# Patient Record
Sex: MALE
[De-identification: names, ages, dates, MRNs, and addresses within clinical notes are randomized per-mention and may not be internally consistent; named-entity substitution may affect disease eponyms.]

---

## 2017-05-28 ENCOUNTER — CHARTING TRANS (OUTPATIENT)
Dept: OTHER | Age: 62
End: 2017-05-28

## 2017-06-09 ENCOUNTER — CHARTING TRANS (OUTPATIENT)
Dept: OTHER | Age: 62
End: 2017-06-09

## 2018-11-03 VITALS
HEIGHT: 67 IN | DIASTOLIC BLOOD PRESSURE: 66 MMHG | HEART RATE: 78 BPM | WEIGHT: 150 LBS | BODY MASS INDEX: 23.54 KG/M2 | SYSTOLIC BLOOD PRESSURE: 110 MMHG | RESPIRATION RATE: 20 BRPM | TEMPERATURE: 98.7 F

## 2018-11-03 VITALS
HEIGHT: 67 IN | WEIGHT: 150 LBS | RESPIRATION RATE: 20 BRPM | BODY MASS INDEX: 23.54 KG/M2 | TEMPERATURE: 99.4 F | HEART RATE: 74 BPM

## 2021-05-12 PROBLEM — Z87.11 HISTORY OF BLEEDING PEPTIC ULCER: Status: ACTIVE | Noted: 2021-05-12

## 2023-12-26 ENCOUNTER — APPOINTMENT (OUTPATIENT)
Dept: GENERAL RADIOLOGY | Age: 68
End: 2023-12-26
Attending: EMERGENCY MEDICINE
Payer: MEDICARE

## 2023-12-26 ENCOUNTER — HOSPITAL ENCOUNTER (INPATIENT)
Facility: HOSPITAL | Age: 68
LOS: 2 days | Discharge: HOME | End: 2023-12-28
Attending: EMERGENCY MEDICINE | Admitting: HOSPITALIST
Payer: MEDICARE

## 2023-12-26 ENCOUNTER — APPOINTMENT (OUTPATIENT)
Dept: CT IMAGING | Age: 68
End: 2023-12-26
Attending: EMERGENCY MEDICINE
Payer: MEDICARE

## 2023-12-26 DIAGNOSIS — K56.609 SMALL BOWEL OBSTRUCTION (HCC): Primary | ICD-10-CM

## 2023-12-26 LAB
ALBUMIN SERPL-MCNC: 4 G/DL (ref 3.4–5)
ALBUMIN/GLOB SERPL: 1.5 {RATIO} (ref 1–2)
ALP LIVER SERPL-CCNC: 83 U/L
ALT SERPL-CCNC: 28 U/L
ANION GAP SERPL CALC-SCNC: 5 MMOL/L (ref 0–18)
AST SERPL-CCNC: 24 U/L (ref 15–37)
BASOPHILS # BLD AUTO: 0.06 X10(3) UL (ref 0–0.2)
BASOPHILS NFR BLD AUTO: 0.9 %
BILIRUB SERPL-MCNC: 0.4 MG/DL (ref 0.1–2)
BUN BLD-MCNC: 18 MG/DL (ref 9–23)
CALCIUM BLD-MCNC: 9 MG/DL (ref 8.5–10.1)
CHLORIDE SERPL-SCNC: 107 MMOL/L (ref 98–112)
CO2 SERPL-SCNC: 25 MMOL/L (ref 21–32)
CREAT BLD-MCNC: 1.01 MG/DL
EGFRCR SERPLBLD CKD-EPI 2021: 80 ML/MIN/1.73M2 (ref 60–?)
EOSINOPHIL # BLD AUTO: 0.27 X10(3) UL (ref 0–0.7)
EOSINOPHIL NFR BLD AUTO: 3.8 %
ERYTHROCYTE [DISTWIDTH] IN BLOOD BY AUTOMATED COUNT: 11.7 %
GLOBULIN PLAS-MCNC: 2.6 G/DL (ref 2.8–4.4)
GLUCOSE BLD-MCNC: 130 MG/DL (ref 70–99)
HCT VFR BLD AUTO: 37.1 %
HGB BLD-MCNC: 12.9 G/DL
IMM GRANULOCYTES # BLD AUTO: 0.02 X10(3) UL (ref 0–1)
IMM GRANULOCYTES NFR BLD: 0.3 %
LIPASE SERPL-CCNC: 48 U/L (ref 13–75)
LYMPHOCYTES # BLD AUTO: 1.99 X10(3) UL (ref 1–4)
LYMPHOCYTES NFR BLD AUTO: 28.2 %
MCH RBC QN AUTO: 33.3 PG (ref 26–34)
MCHC RBC AUTO-ENTMCNC: 34.8 G/DL (ref 31–37)
MCV RBC AUTO: 95.9 FL
MONOCYTES # BLD AUTO: 0.7 X10(3) UL (ref 0.1–1)
MONOCYTES NFR BLD AUTO: 9.9 %
NEUTROPHILS # BLD AUTO: 4.01 X10 (3) UL (ref 1.5–7.7)
NEUTROPHILS # BLD AUTO: 4.01 X10(3) UL (ref 1.5–7.7)
NEUTROPHILS NFR BLD AUTO: 56.9 %
OSMOLALITY SERPL CALC.SUM OF ELEC: 288 MOSM/KG (ref 275–295)
PLATELET # BLD AUTO: 220 10(3)UL (ref 150–450)
POTASSIUM SERPL-SCNC: 3.7 MMOL/L (ref 3.5–5.1)
PROT SERPL-MCNC: 6.6 G/DL (ref 6.4–8.2)
RBC # BLD AUTO: 3.87 X10(6)UL
SODIUM SERPL-SCNC: 137 MMOL/L (ref 136–145)
TROPONIN I SERPL HS-MCNC: 5 NG/L
WBC # BLD AUTO: 7.1 X10(3) UL (ref 4–11)

## 2023-12-26 PROCEDURE — 85025 COMPLETE CBC W/AUTO DIFF WBC: CPT | Performed by: EMERGENCY MEDICINE

## 2023-12-26 PROCEDURE — 80053 COMPREHEN METABOLIC PANEL: CPT

## 2023-12-26 PROCEDURE — 96374 THER/PROPH/DIAG INJ IV PUSH: CPT

## 2023-12-26 PROCEDURE — 83690 ASSAY OF LIPASE: CPT | Performed by: EMERGENCY MEDICINE

## 2023-12-26 PROCEDURE — 83690 ASSAY OF LIPASE: CPT

## 2023-12-26 PROCEDURE — 71045 X-RAY EXAM CHEST 1 VIEW: CPT | Performed by: EMERGENCY MEDICINE

## 2023-12-26 PROCEDURE — 0D9670Z DRAINAGE OF STOMACH WITH DRAINAGE DEVICE, VIA NATURAL OR ARTIFICIAL OPENING: ICD-10-PCS | Performed by: EMERGENCY MEDICINE

## 2023-12-26 PROCEDURE — 96375 TX/PRO/DX INJ NEW DRUG ADDON: CPT

## 2023-12-26 PROCEDURE — 85025 COMPLETE CBC W/AUTO DIFF WBC: CPT

## 2023-12-26 PROCEDURE — 96376 TX/PRO/DX INJ SAME DRUG ADON: CPT

## 2023-12-26 PROCEDURE — 84484 ASSAY OF TROPONIN QUANT: CPT

## 2023-12-26 PROCEDURE — 99285 EMERGENCY DEPT VISIT HI MDM: CPT

## 2023-12-26 PROCEDURE — 74177 CT ABD & PELVIS W/CONTRAST: CPT | Performed by: EMERGENCY MEDICINE

## 2023-12-26 PROCEDURE — 93005 ELECTROCARDIOGRAM TRACING: CPT

## 2023-12-26 PROCEDURE — 80053 COMPREHEN METABOLIC PANEL: CPT | Performed by: EMERGENCY MEDICINE

## 2023-12-26 PROCEDURE — 93010 ELECTROCARDIOGRAM REPORT: CPT

## 2023-12-26 PROCEDURE — 84484 ASSAY OF TROPONIN QUANT: CPT | Performed by: EMERGENCY MEDICINE

## 2023-12-26 RX ORDER — ONDANSETRON 2 MG/ML
4 INJECTION INTRAMUSCULAR; INTRAVENOUS EVERY 4 HOURS PRN
Status: DISCONTINUED | OUTPATIENT
Start: 2023-12-26 | End: 2023-12-26

## 2023-12-26 RX ORDER — ONDANSETRON 2 MG/ML
4 INJECTION INTRAMUSCULAR; INTRAVENOUS EVERY 6 HOURS PRN
Status: DISCONTINUED | OUTPATIENT
Start: 2023-12-26 | End: 2023-12-28

## 2023-12-26 RX ORDER — MORPHINE SULFATE 2 MG/ML
2 INJECTION, SOLUTION INTRAMUSCULAR; INTRAVENOUS EVERY 2 HOUR PRN
Status: DISCONTINUED | OUTPATIENT
Start: 2023-12-26 | End: 2023-12-28

## 2023-12-26 RX ORDER — MORPHINE SULFATE 4 MG/ML
4 INJECTION, SOLUTION INTRAMUSCULAR; INTRAVENOUS ONCE
Status: COMPLETED | OUTPATIENT
Start: 2023-12-26 | End: 2023-12-26

## 2023-12-26 RX ORDER — ONDANSETRON 2 MG/ML
4 INJECTION INTRAMUSCULAR; INTRAVENOUS ONCE
Status: COMPLETED | OUTPATIENT
Start: 2023-12-26 | End: 2023-12-26

## 2023-12-26 RX ORDER — MORPHINE SULFATE 2 MG/ML
1 INJECTION, SOLUTION INTRAMUSCULAR; INTRAVENOUS EVERY 2 HOUR PRN
Status: DISCONTINUED | OUTPATIENT
Start: 2023-12-26 | End: 2023-12-28

## 2023-12-26 RX ORDER — HEPARIN SODIUM 5000 [USP'U]/ML
5000 INJECTION, SOLUTION INTRAVENOUS; SUBCUTANEOUS EVERY 8 HOURS SCHEDULED
Status: DISCONTINUED | OUTPATIENT
Start: 2023-12-26 | End: 2023-12-28

## 2023-12-26 RX ORDER — SODIUM CHLORIDE 9 MG/ML
INJECTION, SOLUTION INTRAVENOUS CONTINUOUS
Status: DISCONTINUED | OUTPATIENT
Start: 2023-12-26 | End: 2023-12-28

## 2023-12-26 RX ORDER — SODIUM CHLORIDE 9 MG/ML
INJECTION, SOLUTION INTRAVENOUS CONTINUOUS
Status: ACTIVE | OUTPATIENT
Start: 2023-12-26 | End: 2023-12-26

## 2023-12-26 RX ORDER — MORPHINE SULFATE 4 MG/ML
4 INJECTION, SOLUTION INTRAMUSCULAR; INTRAVENOUS EVERY 2 HOUR PRN
Status: DISCONTINUED | OUTPATIENT
Start: 2023-12-26 | End: 2023-12-28

## 2023-12-26 RX ORDER — MORPHINE SULFATE 4 MG/ML
4 INJECTION, SOLUTION INTRAMUSCULAR; INTRAVENOUS EVERY 30 MIN PRN
Status: DISCONTINUED | OUTPATIENT
Start: 2023-12-26 | End: 2023-12-26

## 2023-12-26 NOTE — PROGRESS NOTES
NURSING ADMISSION NOTE      Patient admitted via Ambulance  Oriented to room. Safety precautions initiated. Bed in low position. Call light in reach. Pt admitted from [facility] for SBO. A&Ox4. VSS on RA. Rates pain 2/10 to abd. NG tube to LIS. 0.9NS @ 100ml/hr. Up at collette. GS following. NPO, occasional ice chips. Safety precautions in place. All needs met at this time.

## 2023-12-26 NOTE — ED QUICK NOTES
Attempted to give report to Portillo Murillo RN prior to [employer] departure, no answer on RN phone.

## 2023-12-26 NOTE — ED INITIAL ASSESSMENT (HPI)
C/O upper abd pain. States started around 11am and is getting worse. Nausea w/o vomiting. Denies changes in urine or stool.

## 2023-12-27 ENCOUNTER — APPOINTMENT (OUTPATIENT)
Dept: GENERAL RADIOLOGY | Facility: HOSPITAL | Age: 68
End: 2023-12-27
Attending: PHYSICIAN ASSISTANT
Payer: MEDICARE

## 2023-12-27 LAB
ANION GAP SERPL CALC-SCNC: 6 MMOL/L (ref 0–18)
ATRIAL RATE: 65 BPM
BUN BLD-MCNC: 10 MG/DL (ref 9–23)
CALCIUM BLD-MCNC: 8.5 MG/DL (ref 8.5–10.1)
CHLORIDE SERPL-SCNC: 109 MMOL/L (ref 98–112)
CO2 SERPL-SCNC: 26 MMOL/L (ref 21–32)
CREAT BLD-MCNC: 0.81 MG/DL
EGFRCR SERPLBLD CKD-EPI 2021: 95 ML/MIN/1.73M2 (ref 60–?)
GLUCOSE BLD-MCNC: 90 MG/DL (ref 70–99)
OSMOLALITY SERPL CALC.SUM OF ELEC: 291 MOSM/KG (ref 275–295)
P AXIS: 80 DEGREES
P-R INTERVAL: 210 MS
POTASSIUM SERPL-SCNC: 3.8 MMOL/L (ref 3.5–5.1)
Q-T INTERVAL: 436 MS
QRS DURATION: 82 MS
QTC CALCULATION (BEZET): 453 MS
R AXIS: -5 DEGREES
SODIUM SERPL-SCNC: 141 MMOL/L (ref 136–145)
T AXIS: 76 DEGREES
VENTRICULAR RATE: 65 BPM

## 2023-12-27 PROCEDURE — 74019 RADEX ABDOMEN 2 VIEWS: CPT | Performed by: PHYSICIAN ASSISTANT

## 2023-12-27 PROCEDURE — 80048 BASIC METABOLIC PNL TOTAL CA: CPT | Performed by: HOSPITALIST

## 2023-12-27 NOTE — PROGRESS NOTES
Received pt A&Ox4. Maintaining O2 sats on room air. VSS. No tele ordered. Heparin ordered, however pt declining. Ambulating in hallways. NPO. NGT to LIS. Continuous IVF infusing. Denies any pain. XR abdomen obstructive series ordered. Plan of care continues, endorsed report to oncoming JAI Marie at [phone].

## 2023-12-27 NOTE — PLAN OF CARE
Assumed care at 2300. Pt is A&Ox4. Wears glasses. VSS, afebrile. Maintaining O2 sats WDL on RA. Encouraged IS. No tele, Q8 vitals. EP. Last BM 12/25. NPO, okay for ice chips. Voids. Up ad collette. Denies pain. PIV, IVF. No further needs at this time, continue POC. Safety precautions in place.     Problem: Patient/Family Goals  Goal: Patient/Family Long Term Goal  Description: Patient's Long Term Goal: DC home    Interventions:  - GS following  - pain management  -resolve of SBO  - See additional Care Plan goals for specific interventions  Outcome: Progressing  Goal: Patient/Family Short Term Goal  Description: Patient's Short Term Goal: pain management  12/26 NOC: sleep    Interventions:   - Per MAR  - NPO and advance per GS  - NG tube  - See additional Care Plan goals for specific interventions  Outcome: Progressing

## 2023-12-27 NOTE — PLAN OF CARE
Patient AAOx4. Glasses. Room air. No tele. NG tube began at LIS, clamped, now removed. Started on clears. Up ad collette. IVF. Patient rounded on routinely. Patient updated on plan of care.       Problem: Patient/Family Goals  Goal: Patient/Family Long Term Goal  Description: Patient's Long Term Goal: DC home    Interventions:  - GS following  - pain management  -resolve of SBO  - See additional Care Plan goals for specific interventions  Outcome: Progressing  Goal: Patient/Family Short Term Goal  Description: Patient's Short Term Goal: pain management  12/26 NOC: sleep    Interventions:   - Per MAR  - NPO and advance per GS  - NG tube  - See additional Care Plan goals for specific interventions  Outcome: Progressing

## 2023-12-28 VITALS
HEIGHT: 67 IN | SYSTOLIC BLOOD PRESSURE: 108 MMHG | TEMPERATURE: 98 F | WEIGHT: 138 LBS | HEART RATE: 58 BPM | OXYGEN SATURATION: 99 % | BODY MASS INDEX: 21.66 KG/M2 | RESPIRATION RATE: 16 BRPM | DIASTOLIC BLOOD PRESSURE: 51 MMHG

## 2023-12-28 NOTE — PLAN OF CARE
Pt is a&ox4. Glasses. RA sating WNL. No tele. EP      (non-cardiac). Heparin. IVF's. Voids per BRP, up ad collette. Tolerating a clear diet- per gen surg okay to advance as tolerated. Pt updated on poc. No further needs at this time.     Problem: Patient/Family Goals  Goal: Patient/Family Long Term Goal  Description: Patient's Long Term Goal: DC home    Interventions:  - GS following  - pain management  -resolve of SBO  - See additional Care Plan goals for specific interventions  Outcome: Progressing  Goal: Patient/Family Short Term Goal  Description: Patient's Short Term Goal: pain management  12/26 NOC: sleep  12/27 NOC: sleep    Interventions:   - Per MAR  - NPO and advance per GS  - NG tube  - See additional Care Plan goals for specific interventions  Outcome: Progressing

## 2023-12-28 NOTE — PLAN OF CARE
Pt is A&Ox4. Wears glasses. VSS, afebrile. Maintaining O2 sats WDL on RA. Encouraged IS. No tele, Q8 vitals. EP. Last BM 12/25. CLD. Voids. Up ad collette. Denies pain. PIV, IVF. No further needs at this time, continue POC.  Safety precautions in place     Problem: Patient/Family Goals  Goal: Patient/Family Long Term Goal  Description: Patient's Long Term Goal: DC home    Interventions:  - GS following  - pain management  -resolve of SBO  - See additional Care Plan goals for specific interventions  12/28/2023 0103 by Loyda Brennan, RN  Outcome: Progressing  12/28/2023 0102 by Loyda Brennan, RN  Outcome: Progressing  Goal: Patient/Family Short Term Goal  Description: Patient's Short Term Goal: pain management  12/26 [address] [place]: sleep  12/27 [address] [place]: sleep    Interventions:   - Per MAR  - NPO and advance per GS  - NG tube  - See additional Care Plan goals for specific interventions  12/28/2023 0103 by Loyda Brennan, RN  Outcome: Progressing  12/28/2023 0102 by Loyda Brennan, RN  Outcome: Progressing

## 2023-12-28 NOTE — PROGRESS NOTES
NURSING DISCHARGE NOTE     Discharged Home via Wheelchair. Accompanied by Support staff  Belongings Taken by patient/family. Patient is stable to discharge home. Medically cleared by all consults and hospitalist. Reviewed discharge instructions about medications and follow-up appointments with the patient. Patient verbalized understanding. Questions and concerns addressed. IV line dc'ed. Pressure and dressing applied. Clean/Dry/Intact. Discharge navigator completed. Tele box removed,cleaned and returned to drawer. All belongings sent with patient.

## 2023-12-29 NOTE — DISCHARGE SUMMARY
General Medicine Discharge Summary     Patient ID:  Gloria Quintana III  77year old  [id number]  7/15/1955    Admit date: 12/26/2023    Discharge date and time: 12/28/2023  5:48 PM     Attending Luis Bowser MD    Primary Care Physician: Kirstin Bailey MD     Reason for admission: sbo      Risk of readmission: Gloria Quintana III has Moderate Risk of readmission after discharge from the hospital.    Hospital Discharge Diagnoses:  as above    Lace+ Score: 37  59-90 High Risk  29-58 Medium Risk  0-28   Low Risk. TCM Follow-Up Recommendation:  LACE 29-58: Moderate Risk of readmission after discharge from the hospital.          Discharge Condition: alive    Important follow up  -PCP Kirstin Bailey MD see appointments listed below    -Labs: prn  -Radiology:  prn            Hospital Course:    77year old male with BPH who is admitted for abdominal pain, n/v.      **pSBO on CT a/p-resolved  -pt with hx appendectomy in past. Hx ulcer as well  -never had sbo in past  -surgery on consult, appreciate  -IV morphine prn, zofran prn  -sp NG in place- management per surgery. Currently clamped  -diet advancement per surgery     **incidental finding of urinary bladder diverticulum and enlarged prostate on CT a/p - f/u with PCP     Bph, no acute issue, f/u outpatient  Consults: IP CONSULT TO GENERAL SURGERY    Radiology:  XR ABDOMEN OBSTRUCTIVE SERIES ROUTINE(2 VW)(CPT=74019)    Result Date: 12/27/2023  PROCEDURE:  XR ABDOMEN OBSTRUCTIVE SERIES ROUTINE(2 VW)(CPT=74019)  TECHNIQUE:  2 view obstructive series of the abdomen and pelvis were obtained. COMPARISON:  [facility], CT, CT ABDOMEN PELVIS IV CONTRAST, NO ORAL (ER), 12/26/2023, 4:39 AM.  INDICATIONS:  Patient presents with severe abdominal pain, clinical concern for potential small bowel obstruction. PATIENT STATED HISTORY: (As transcribed by Technologist)  Patient states he came to the E.R. on Monday night with severe abdominal pain.     FINDINGS: There is an NG tube noted with the tip in the proximal stomach/gastric cardia. There is no current evidence of significant small bowel dilatation or fluid distension. There is a moderate to extensive degree of retained feces throughout the colon. No evidence of free air. CONCLUSION:  1. NG tube placement with relative decompression of the small bowel in comparison to the recent CT scan of 12/26/2023. 2. Moderate to extensive retained feces throughout the colon. 3. No evidence of free air or acute process. LOCATION:  [id number]    Dictated by (CST): Gia Bah DO on 12/27/2023 at 11:53 AM     Finalized by (CST): Gia Bah DO on 12/27/2023 at 11:55 AM       XR CHEST AP PORTABLE  (CPT=71045)    Result Date: 12/26/2023  PROCEDURE:  XR CHEST AP PORTABLE  (CPT=71045)  TECHNIQUE:  AP chest radiograph was obtained. COMPARISON:  None. INDICATIONS:  Verify correct tube placement  PATIENT STATED HISTORY: (As transcribed by Technologist)  Verify correct tube placement;    FINDINGS:  NG tube tip and side-port in the stomach. Minimal atelectasis/scarring in the lower lungs. Normal heart size and pulmonary vascularity. No pleural effusion or pneumothorax. No lobar consolidation. CONCLUSION:  NG tube tip and side-port in the stomach. LOCATION:  [facility]      Dictated by (CST): Miguel Ángel Mcarthur MD on 12/26/2023 at 7:21 AM     Finalized by (CST): Miguel Ángel Mcarthur MD on 12/26/2023 at 7:21 AM       CT ABDOMEN PELVIS IV CONTRAST, NO ORAL (ER)    Result Date: 12/26/2023  PROCEDURE:  CT ABDOMEN PELVIS IV CONTRAST, NO ORAL (ER)  COMPARISON:  None. INDICATIONS:  Epigastic pain, nausea, denies fevers. Worsening since 11pm, vomiting, eval for SBO  TECHNIQUE:  CT scanning was performed from the dome of the diaphragm to the pubic symphysis with non-ionic intravenous contrast material. Post contrast coronal MPR imaging was performed. Dose reduction techniques were used.  Dose information is transmitted to the ACR ([facility] Radiology) NRDR ([address]) which includes the Dose Index Registry. PATIENT STATED HISTORY:(As transcribed by Technologist)  Epigastic pain, nausea, denies fevers. Worsening since 11pm, vomiting, eval for SBO    CONTRAST USED:  80cc of Isovue 370  FINDINGS: LUNG BASE:  Scattered atelectasis/scarring. Small cluster of noncalcified micronodularity in the periphery of the right lower lobe on image 1 series 301 measuring up to 3 mm. LIVER:  Subcentimeter hypodensities in the liver too small to further characterize and warrant no specific follow-up measuring up to 9 mm. Additional simple appearing hepatic cysts noted measuring up to 13 mm. BILIARY:  Unremarkable. SPLEEN:  Unremarkable. PANCREAS:  Unremarkable. ADRENALS:  Unremarkable. KIDNEYS:  Simple cyst along the lower pole right kidney measuring up to 1.7 cm. AORTA/VASCULAR:  Minimal mixed plaque in the aorta RETROPERITONEUM:  Unremarkable. BOWEL/MESENTERY:  Moderate feces in the colon. There are mildly dilated loops of small bowel with scattered air-fluid levels. A reference diameter of 1 of the small bowel loops is approximately 3.2 cm. There is a probable transition point in the mid abdomen best seen on image 48 of series 2. The overall findings are concerning for a partial small bowel obstruction, with nonspecific enteritis or other etiologies not entirely excluded. Clinical correlation and follow-up is suggested. There is no free air or free fluid. ABDOMINAL WALL:  Unremarkable. PELVIC ORGANS:  Urinary bladder wall thickening could be related to chronic outlet obstructionversus cystitis. Clinical correlation recommended. Large diverticulum along the right side the urinary bladder measuring up to 5.4 cm. There is moderate prostate enlargement deforming the base the urinary bladder. Pelvic phleboliths. LYMPH NODES:  No lymphadenopathy in the abdomen or pelvis.  BONES:  Degenerative changes in the spine and hips. OTHER:  None. CONCLUSION:   1. There are mildly dilated loops of small bowel with scattered air-fluid levels. A reference diameter of 1 of the small bowel loops is approximately 3.2 cm. There is a probable transition point in the mid abdomen best seen on image 48 of series 2. The  overall findings are concerning for a partial small bowel obstruction, with nonspecific enteritis or other etiologies not entirely excluded. Clinical correlation and follow-up is suggested. 2. Urinary bladder wall thickening could be related to chronic outlet obstructionversus cystitis. Clinical correlation recommended. Large diverticulum along the right side the urinary bladder measuring up to 5.4 cm. 3. There is moderate prostate enlargement deforming the base the urinary bladder. Please see above for further details. A preliminary report was provided by the [facility] teleradiology service. LOCATION:  [facility]   Dictated by (CST): Erin Carr MD on 12/26/2023 at 6:25 AM     Finalized by (CST): Erin Carr MD on 12/26/2023 at 6:29 AM         Operative Procedures:      Disposition: alive    Patient Instructions: Current and discharge medications reviewed with patient  Discharge Medication List as of 12/28/2023  4:49 PM        CONTINUE these medications which have NOT CHANGED    Details   Pantoprazole Sodium 40 MG Oral Tab EC Take 1 tablet (40 mg total) by mouth daily. Before meal, Normal, Disp-30 tablet, R-1      Probiotic Product (SOLUBLE FIBER/PROBIOTICS OR) Take  by mouth. Historical, Oral           STOP taking these medications       azithromycin (ZITHROMAX Z-JERICHO) 250 MG Oral Tab        Doxycycline Hyclate 100 MG Oral Tab        mupirocin 2 % External Ointment        sucralfate 1 g Oral Tab        Zn-Pyg Afri-Nettle-Saw Palmet (SAW PALMETTO COMPLEX OR)              Home Medication Changes:      Activity: as directed  Diet: as directed  Wound Care:  prn  Code Status: No Order  O2: prn    Follow-up with Follow up with Chelsea Paredes I   Specialty: Internal Medicine  Follow up  Coordinator to call to schedule appt [phone]  [address]  [phone]            Total Time Coordinating Care: 35 minutes    Patient had opportunity to ask questions and state understand and agree with therapeutic plan as outlined    Thank You,  Dilma Corey, [address] [place] [address]  Internal Medicine  Answering Service number: [phone]

## 2024-05-09 ENCOUNTER — APPOINTMENT (OUTPATIENT)
Dept: GENERAL RADIOLOGY | Age: 69
End: 2024-05-09
Attending: NURSE PRACTITIONER

## 2024-05-09 ENCOUNTER — HOSPITAL ENCOUNTER (EMERGENCY)
Age: 69
Discharge: HOME | End: 2024-05-09

## 2024-05-09 VITALS
BODY MASS INDEX: 21.97 KG/M2 | OXYGEN SATURATION: 99 % | RESPIRATION RATE: 16 BRPM | TEMPERATURE: 99 F | DIASTOLIC BLOOD PRESSURE: 72 MMHG | WEIGHT: 140 LBS | HEART RATE: 66 BPM | SYSTOLIC BLOOD PRESSURE: 126 MMHG | HEIGHT: 67 IN

## 2024-05-09 DIAGNOSIS — S39.012A STRAIN OF LUMBAR REGION, INITIAL ENCOUNTER: ICD-10-CM

## 2024-05-09 DIAGNOSIS — M47.9 SPONDYLOSIS: Primary | ICD-10-CM

## 2024-05-09 PROCEDURE — 99283 EMERGENCY DEPT VISIT LOW MDM: CPT

## 2024-05-09 PROCEDURE — 72100 X-RAY EXAM L-S SPINE 2/3 VWS: CPT | Performed by: NURSE PRACTITIONER

## 2024-05-09 PROCEDURE — 96372 THER/PROPH/DIAG INJ SC/IM: CPT

## 2024-05-09 RX ORDER — METHYLPREDNISOLONE 4 MG/1
TABLET ORAL
Qty: 1 EACH | Refills: 0 | Status: SHIPPED | OUTPATIENT
Start: 2024-05-09

## 2024-05-09 RX ORDER — METHOCARBAMOL 500 MG/1
500 TABLET, FILM COATED ORAL 2 TIMES DAILY PRN
Qty: 20 TABLET | Refills: 0 | Status: SHIPPED | OUTPATIENT
Start: 2024-05-09 | End: 2024-05-19

## 2024-05-09 RX ORDER — KETOROLAC TROMETHAMINE 15 MG/ML
15 INJECTION, SOLUTION INTRAMUSCULAR; INTRAVENOUS ONCE
Status: DISCONTINUED | OUTPATIENT
Start: 2024-05-09 | End: 2024-05-09

## 2024-05-09 RX ORDER — KETOROLAC TROMETHAMINE 15 MG/ML
15 INJECTION, SOLUTION INTRAMUSCULAR; INTRAVENOUS ONCE
Status: COMPLETED | OUTPATIENT
Start: 2024-05-09 | End: 2024-05-09

## 2024-05-09 NOTE — ED PROVIDER NOTES
Patient Seen in: [facility] [place]      History     Chief Complaint   Patient presents with    Back Pain     Stated Complaint: back pain since last thur    Subjective:   HPI    68-year-old male presents today with complaints of left-sided lower back pain that occurred 1 week ago when he was straightening up his garage and lifted an 80 pound compressor with 1 arm pulling it over.  Patient states he felt okay after doing this but the next day developed severe lower back pain after cutting the grass.  Patient states last night he found some old muscle relaxers from 10 years ago that a friend gave him and took 1 and felt immensely better.  Patient states in the middle the night though he feels like the muscle relaxer wore off and he began to have pain again.  Patient denies any pain radiating down his legs.  Patient denies any loss of bowel or bladder function.    Objective:   Past Medical History:    BPH (benign prostatic hyperplasia)    Constipation    Skin cancer of face              Past Surgical History:   Procedure Laterality Date    Appendectomy  1975    Colonoscopy  4-30-12 [facility] for SX    Skin surgery  06/15/2021    BCC left nasal tip mohs by Dr Mcdonough                Social History     Socioeconomic History    Marital status:    Occupational History    Occupation: Retired   Tobacco Use    Smoking status: Former     Types: Cigarettes    Smokeless tobacco: Never   Vaping Use    Vaping status: Never Used   Substance and Sexual Activity    Alcohol use: Yes     Comment: wine and a few drinks per week    Drug use: No    Sexual activity: Yes     Partners: Female   Other Topics Concern    Caffeine Concern Yes    Exercise Yes    Seat Belt Yes     Social Determinants of Health     Food Insecurity: No Food Insecurity (12/26/2023)    Food Insecurity     Food Insecurity: Never true   Transportation Needs: No Transportation Needs (12/26/2023)    Transportation Needs     Lack of  Transportation: No   Housing Stability: Low Risk  (12/26/2023)    Housing Stability     Housing Instability: No              Review of Systems   Constitutional: Negative.    HENT: Negative.     Eyes: Negative.    Respiratory: Negative.     Cardiovascular: Negative.    Gastrointestinal: Negative.    Endocrine: Negative.    Genitourinary: Negative.    Musculoskeletal:  Positive for back pain.   Skin: Negative.    Allergic/Immunologic: Negative.    Neurological: Negative.    Hematological: Negative.    Psychiatric/Behavioral: Negative.         Positive for stated complaint: back pain since last thur  Other systems are as noted in HPI.  Constitutional and vital signs reviewed.      All other systems reviewed and negative except as noted above.    Physical Exam     ED Triage Vitals [05/09/24 1041]   /69   Pulse 70   Resp 16   Temp 98.5 °F (36.9 °C)   Temp src    SpO2 98 %   O2 Device None (Room air)       Current Vitals:   Vital Signs  BP: 139/69  Pulse: 70  Resp: 16  Temp: 98.5 °F (36.9 °C)    Oxygen Therapy  SpO2: 98 %  O2 Device: None (Room air)            Physical Exam  Vitals and nursing note reviewed.   Constitutional:       Appearance: Normal appearance. He is normal weight.   HENT:      Head: Normocephalic.      Right Ear: External ear normal.      Left Ear: External ear normal.   Eyes:      Conjunctiva/sclera: Conjunctivae normal.      Pupils: Pupils are equal, round, and reactive to light.   Cardiovascular:      Rate and Rhythm: Normal rate and regular rhythm.      Pulses: Normal pulses.      Heart sounds: Normal heart sounds.   Pulmonary:      Effort: Pulmonary effort is normal.      Breath sounds: Normal breath sounds.   Abdominal:      General: Abdomen is flat. Bowel sounds are normal.      Palpations: Abdomen is soft.   Musculoskeletal:         General: Tenderness present.      Cervical back: Normal range of motion and neck supple.      Comments: Tenderness to palpation over the left lumbar paraspinal  region.  Pain elicited with push and pull.  Negative straight leg raise.   Skin:     General: Skin is warm.      Capillary Refill: Capillary refill takes less than 2 seconds.   Neurological:      Mental Status: He is alert and oriented to person, place, and time.   Psychiatric:         Mood and Affect: Mood normal.             ED Course   Labs Reviewed - No data to display                   MDM      68-year-old male presents today with complaints of left-sided lower back pain that occurred 1 week ago when he was straightening up his garage and lifted an 80 pound compressor with 1 arm pulling it over.  Patient states he felt okay after doing this but the next day developed severe lower back pain after cutting the grass.  Patient states last night he found some old muscle relaxers from 10 years ago that a friend gave him and took 1 and felt immensely better.  Patient states in the middle the night though he feels like the muscle relaxer wore off and he began to have pain again.  Patient denies any pain radiating down his legs.  Patient denies any loss of bowel or bladder function.  Vital signs: Please see EMR.  Physical exam: Please see exam.  Differential diagnosis: Lumbar strain, muscle spasm, lumbar ago.  XR LUMBAR SPINE (MIN 2 VIEWS) (CPT=72100)    Result Date: 5/9/2024  CONCLUSION:  Stable lumbar spondylosis, most severe at L3-4.  No fracture.   LOCATION:  [place] [address]   Dictated by (CST): Mo Kuo MD on 5/09/2024 at 12:47 PM     Finalized by (CST): Mo Kuo MD on 5/09/2024 at 12:50 PM      Will diagnosed with lumbar strain and spondylosis.  Will prescribe Medrol Dosepak and Robaxin.  Patient is to follow-up with primary care provider within 1 week as needed.  If no improvement patient is to follow-up with orthopedics.  ED precautions given.  Note to Patient  The 21st Century Cures Act makes medical notes like these available to patients in the interest of transparency. However, be advised this is  a medical document and is intended as [id number] communication; it is written in medical language and may appear blunt, direct, or contain abbreviations or verbiage that are unfamiliar. Medical documents are intended to carry relevant information, facts as evident, and the clinical opinion of the practitioner.     This report has been produced using speech recognition software, and may contain errors related to grammar, punctuation, spelling, words or phrases unrecognized or not translated appropriately to text; these errors may be referred to the dictating provider for further clarification and/or addendum as needed.                                     Medical Decision Making  68-year-old male presents today with complaints of left-sided lower back pain that occurred 1 week ago when he was straightening up his garage and lifted an 80 pound compressor with 1 arm pulling it over.  Patient states he felt okay after doing this but the next day developed severe lower back pain after cutting the grass.  Patient states last night he found some old muscle relaxers from 10 years ago that a friend gave him and took 1 and felt immensely better.  Patient states in the middle the night though he feels like the muscle relaxer wore off and he began to have pain again.  Patient denies any pain radiating down his legs.  Patient denies any loss of bowel or bladder function.    Problems Addressed:  Spondylosis: acute illness or injury  Strain of lumbar region, initial encounter: acute illness or injury    Amount and/or Complexity of Data Reviewed  Radiology: ordered. Decision-making details documented in ED Course.  ECG/medicine tests: ordered. Decision-making details documented in ED Course.    Risk  Prescription drug management.        Disposition and Plan     Clinical Impression:  1. Spondylosis    2. Strain of lumbar region, initial encounter         Disposition:  Discharge  5/9/2024 12:54 pm    Follow-up:  Narinder Gamboa,  MD  [address] [facility]  [address]  [facility] [address] [phone]    Follow up in 1 week(s)  As needed    Jeremie Anderson MD  [address] [place] [address] [address]  [facility] [address] [phone]    Follow up in 1 week(s)  If symptoms worsen          Medications Prescribed:  Current Discharge Medication List        START taking these medications    Details   methylPREDNISolone (MEDROL) 4 MG Oral Tablet Therapy Pack Dosepack: take as directed  Qty: 1 each, Refills: 0      methocarbamol 500 MG Oral Tab Take 1 tablet (500 mg total) by mouth 2 (two) times daily as needed.  Qty: 20 tablet, Refills: 0